# Patient Record
Sex: FEMALE | Race: WHITE | URBAN - METROPOLITAN AREA
[De-identification: names, ages, dates, MRNs, and addresses within clinical notes are randomized per-mention and may not be internally consistent; named-entity substitution may affect disease eponyms.]

---

## 2017-12-09 ENCOUNTER — OFFICE VISIT (OUTPATIENT)
Dept: URGENT CARE | Facility: URGENT CARE | Age: 57
End: 2017-12-09
Payer: COMMERCIAL

## 2017-12-09 VITALS
BODY MASS INDEX: 27.78 KG/M2 | SYSTOLIC BLOOD PRESSURE: 117 MMHG | TEMPERATURE: 98.2 F | OXYGEN SATURATION: 100 % | WEIGHT: 177.38 LBS | DIASTOLIC BLOOD PRESSURE: 75 MMHG | HEART RATE: 72 BPM

## 2017-12-09 DIAGNOSIS — J01.90 ACUTE SINUSITIS WITH SYMPTOMS > 10 DAYS: Primary | ICD-10-CM

## 2017-12-09 PROCEDURE — 99213 OFFICE O/P EST LOW 20 MIN: CPT | Performed by: PHYSICIAN ASSISTANT

## 2017-12-09 RX ORDER — CEFUROXIME AXETIL 500 MG/1
500 TABLET ORAL 2 TIMES DAILY
Qty: 20 TABLET | Refills: 0 | Status: SHIPPED | OUTPATIENT
Start: 2017-12-09

## 2017-12-09 RX ORDER — PREDNISONE 20 MG/1
20 TABLET ORAL DAILY
Qty: 5 TABLET | Refills: 0 | Status: SHIPPED | OUTPATIENT
Start: 2017-12-09

## 2017-12-09 NOTE — NURSING NOTE
"Chief Complaint   Patient presents with     Sinus Problem     sinus headache, sinus drainage and nasal congestion for three weeks.      Fatigue     fatigue for 1+ weeks.     Blood Draw     request Vitamin D level blood draw.        Initial /75  Pulse 72  Temp 98.2  F (36.8  C) (Oral)  Wt 177 lb 6 oz (80.5 kg)  SpO2 100%  Breastfeeding? No  BMI 27.78 kg/m2 Estimated body mass index is 27.78 kg/(m^2) as calculated from the following:    Height as of 3/5/08: 5' 7\" (1.702 m).    Weight as of this encounter: 177 lb 6 oz (80.5 kg).  Medication Reconciliation: complete    "

## 2017-12-09 NOTE — PROGRESS NOTES
SUBJECTIVE:   Richa Castillo is a 57 year old female presenting with a chief complaint of   1) sinus congestion for the past few weeks, worsening with sinus pressure and headache  2) fatigue .  Onset of symptoms was as above.  Course of illness is worsening.    Severity moderate  Current and Associated symptoms: as above  Treatment measures tried include nasal sprays, sudafed.  Predisposing factors include chronic sinus infections, follows with allergist.  3) will wait to have vit d. Level drawn with her PCP.      No past medical history on file.     Sinusitis  Allergies  Nasal polyps    There is no problem list on file for this patient.    Social History   Substance Use Topics     Smoking status: Current Every Day Smoker     Packs/day: 1.00     Years: 20.00     Types: Cigarettes     Smokeless tobacco: Never Used     Alcohol use Not on file       ROS:  CONSTITUTIONAL:NEGATIVE for fever, chills, change in weight  INTEGUMENTARY/SKIN: NEGATIVE for worrisome rashes, moles or lesions  EYES: NEGATIVE for vision changes or irritation  ENT/MOUTH: as per HPI  RESP:NEGATIVE for significant cough or SOB  CV: NEGATIVE for chest pain, palpitations or peripheral edema  GI: NEGATIVE for nausea, abdominal pain, heartburn, or change in bowel habits  MUSCULOSKELETAL: NEGATIVE for significant arthralgias or myalgia  NEURO: NEGATIVE for weakness, dizziness or paresthesias    OBJECTIVE  :/75  Pulse 72  Temp 98.2  F (36.8  C) (Oral)  Wt 177 lb 6 oz (80.5 kg)  SpO2 100%  Breastfeeding? No  BMI 27.78 kg/m2  GENERAL APPEARANCE: healthy, alert and no distress  EYES: EOMI,  PERRL, conjunctiva clear  HENT: ear canals and TM's normal.  Nose and mouth without ulcers, erythema or lesions  HENT: nasal turbinates boggy with bluish hue and rhinorrhea yellow  NECK: supple, nontender, no lymphadenopathy  RESP: lungs clear to auscultation - no rales, rhonchi or wheezes  CV: regular rates and rhythm, normal S1 S2, no murmur  noted  ABDOMEN:  soft, nontender, no HSM or masses and bowel sounds normal  NEURO: Normal strength and tone, sensory exam grossly normal,  normal speech and mentation  SKIN: no suspicious lesions or rashes     (J01.90) Acute sinusitis with symptoms > 10 days  (primary encounter diagnosis)  Comment: with underlying allergies  Plan: predniSONE (DELTASONE) 20 MG tablet, cefuroxime        (CEFTIN) 500 MG tablet    F/U with ENT  Patient expresses understanding and agreement with the assessment and plan as above.

## 2017-12-09 NOTE — MR AVS SNAPSHOT
"              After Visit Summary   2017    Richa Castillo    MRN: [id number]           Patient Information     Date Of Birth          1960        Visit Information        Provider Department      2017 3:30 PM Caterina Yoo PA-C [facility]        Today's Diagnoses     Acute sinusitis with symptoms > 10 days    -  1       Follow-ups after your visit        Who to contact     If you have questions or need follow up information about today's clinic visit or your schedule please contact [facility] directly at [phone].  Normal or non-critical lab and imaging results will be communicated to you by [email or web address], letter or phone within 4 business days after the clinic has received the results. If you do not hear from us within 7 days, please contact the clinic through [email or web address] or phone. If you have a critical or abnormal lab result, we will notify you by phone as soon as possible.  Submit refill requests through [employer] or call your pharmacy and they will forward the refill request to us. Please allow 3 business days for your refill to be completed.          Additional Information About Your Visit        MyChart Information     [employer] lets you send messages to your doctor, view your test results, renew your prescriptions, schedule appointments and more. To sign up, go to [email or web address] . Click on \"Log in\" on the left side of the screen, which will take you to the Welcome page. Then click on \"Sign up Now\" on the right side of the page.     You will be asked to enter the access code listed below, as well as some personal information. Please follow the directions to create your username and password.     Your access code is: [email or web address]  Expires: 3/9/2018  5:28 PM     Your access code will  in 90 days. If you need help or a new code, please call your [place] clinic or [phone].        Care EveryWhere ID     This is " your Care EveryWhere ID. This could be used by other organizations to access your [facility] medical records  [id number]        Your Vitals Were     Pulse Temperature Pulse Oximetry Breastfeeding? BMI (Body Mass Index)       72 98.2  F (36.8  C) (Oral) 100% No 27.78 kg/m2        Blood Pressure from Last 3 Encounters:   12/09/17 117/75   11/23/16 108/74   09/12/14 124/64    Weight from Last 3 Encounters:   12/09/17 177 lb 6 oz (80.5 kg)   11/23/16 181 lb 14.4 oz (82.5 kg)   09/12/14 184 lb (83.5 kg)              Today, you had the following     No orders found for display         Today's Medication Changes          These changes are accurate as of: 12/9/17  5:28 PM.  If you have any questions, ask your nurse or doctor.               Stop taking these medicines if you haven't already. Please contact your care team if you have questions.     ADVAIR DISKUS IN   Stopped by:  Caterina Yoo PA-C           ipratropium - albuterol 0.5 mg/2.5 mg/3 mL 0.5-2.5 (3) MG/3ML neb solution   Commonly known as:  DUONEB   Stopped by:  Caterina Yoo PA-C           NASONEX 50 MCG/ACT spray   Generic drug:  mometasone   Stopped by:  Caterina Yoo PA-C                Where to get your medicines      These medications were sent to [facility] [address] [address]     Phone:  [phone]     cefuroxime 500 MG tablet    predniSONE 20 MG tablet                Primary Care Provider Fax #    Physician No Ref-Primary [phone]       No address on file        Equal Access to Services     MIKE CARLSON AH: Laci Dumont, henry solomon, kaylah arango, laura copeland. So [facility] [phone].    ATENCIÓN: Si habla español, tiene a costa disposición servicios gratuitos de asistencia lingüística. Llame al [phone].    We comply with applicable federal civil rights laws and [place] laws.  We do not discriminate on the basis of race, color, national origin, age, disability, sex, sexual orientation, or gender identity.            Thank you!     Thank you for choosing [facility]  for your care. Our goal is always to provide you with excellent care. Hearing back from our patients is one way we can continue to improve our services. Please take a few minutes to complete the written survey that you may receive in the mail after your visit with us. Thank you!             Your Updated Medication List - Protect others around you: Learn how to safely use, store and throw away your medicines at www.disposemymeds.org.          This list is accurate as of: 12/9/17  5:28 PM.  Always use your most recent med list.                   Brand Name Dispense Instructions for use Diagnosis    albuterol 108 (90 BASE) MCG/ACT Inhaler    PROAIR HFA/PROVENTIL HFA/VENTOLIN HFA     Inhale 2 puffs into the lungs every 6 hours        cefuroxime 500 MG tablet    CEFTIN    20 tablet    Take 1 tablet (500 mg) by mouth 2 times daily    Acute sinusitis with symptoms > 10 days       DULERA IN      Inhale 2 puffs into the lungs daily        predniSONE 20 MG tablet    DELTASONE    5 tablet    Take 1 tablet (20 mg) by mouth daily    Acute sinusitis with symptoms > 10 days

## 2019-07-30 ENCOUNTER — HOSPITAL LABORATORY (OUTPATIENT)
Dept: OTHER | Facility: CLINIC | Age: 59
End: 2019-07-30

## 2019-08-01 LAB
BACTERIA SPEC CULT: NORMAL
Lab: NORMAL
SPECIMEN SOURCE: NORMAL